# Patient Record
Sex: MALE | Race: WHITE | NOT HISPANIC OR LATINO | URBAN - NONMETROPOLITAN AREA
[De-identification: names, ages, dates, MRNs, and addresses within clinical notes are randomized per-mention and may not be internally consistent; named-entity substitution may affect disease eponyms.]

---

## 2023-10-10 ENCOUNTER — OFFICE VISIT (OUTPATIENT)
Dept: PRIMARY CARE | Facility: CLINIC | Age: 28
End: 2023-10-10
Payer: COMMERCIAL

## 2023-10-10 VITALS
HEART RATE: 123 BPM | WEIGHT: 165.25 LBS | OXYGEN SATURATION: 97 % | SYSTOLIC BLOOD PRESSURE: 174 MMHG | DIASTOLIC BLOOD PRESSURE: 90 MMHG

## 2023-10-10 DIAGNOSIS — R00.0 TACHYCARDIA: ICD-10-CM

## 2023-10-10 DIAGNOSIS — F41.9 ANXIETY: Primary | ICD-10-CM

## 2023-10-10 DIAGNOSIS — Z13.1 DIABETES MELLITUS SCREENING: ICD-10-CM

## 2023-10-10 DIAGNOSIS — R03.0 ELEVATED BP WITHOUT DIAGNOSIS OF HYPERTENSION: ICD-10-CM

## 2023-10-10 DIAGNOSIS — Z13.0 SCREENING FOR DEFICIENCY ANEMIA: ICD-10-CM

## 2023-10-10 PROBLEM — F41.1 GENERALIZED ANXIETY DISORDER: Status: ACTIVE | Noted: 2023-10-10

## 2023-10-10 PROCEDURE — 99213 OFFICE O/P EST LOW 20 MIN: CPT | Performed by: FAMILY MEDICINE

## 2023-10-10 RX ORDER — SERTRALINE HYDROCHLORIDE 50 MG/1
50 TABLET, FILM COATED ORAL DAILY
Qty: 30 TABLET | Refills: 5 | Status: SHIPPED | OUTPATIENT
Start: 2023-10-10 | End: 2024-04-12

## 2023-10-10 RX ORDER — PROPRANOLOL HYDROCHLORIDE 20 MG/1
TABLET ORAL
COMMUNITY
Start: 2019-04-22

## 2023-10-10 RX ORDER — HYDROXYZINE PAMOATE 25 MG/1
25 CAPSULE ORAL 3 TIMES DAILY PRN
Qty: 30 CAPSULE | Refills: 0 | Status: SHIPPED | OUTPATIENT
Start: 2023-10-10 | End: 2023-11-09

## 2023-10-10 ASSESSMENT — ENCOUNTER SYMPTOMS
CHEST TIGHTNESS: 1
PALPITATIONS: 1
APPETITE CHANGE: 1
SHORTNESS OF BREATH: 1

## 2023-10-10 NOTE — PROGRESS NOTES
Subjective   Patient ID: Marc Pineda is a 27 y.o. male who presents for Anxiety (Discuss starting on medication again, fast heart rate ).  Anxiety  Symptoms include palpitations and shortness of breath. Patient reports no chest pain.     -just started, last week 1 day by evening went away  -last night began again   -yesterday morning drank coffee, previously daily coffee drinker   -last night had SOB, chest tightness, palpitations   -when heart races, SOB because anxious about the racing heart   -worried last night of it being something more, got worse symptoms   -felt like panic attack, took sleeping pills, slept and felt more normal this morning   -coming into Dr today got worse again   -trigger: was occasionally drinking celsius energy drinks, last week when had some had   -yesterday morning, lack of appetite with symptoms   -gets better with walking   -more stress this summer, felt better, now feels much worse   -after all the stress finished, crashed with anxiety 1 month after   -Saturday - chugged coffee, when hunting got more panicked     Recheck BP  -176/84   -check from previous visits are high as well    Family hx  -most on anxiety medications, sister, mom, aunts / uncles      Review of Systems   Constitutional:  Positive for appetite change.   Respiratory:  Positive for chest tightness and shortness of breath.         When having palpitations / racing heart   Cardiovascular:  Positive for palpitations. Negative for chest pain.       Objective   /90   Pulse (!) 123   Wt 75 kg (165 lb 4 oz)   SpO2 97%     Physical Exam  Constitutional:       Appearance: Normal appearance.   HENT:      Head: Normocephalic.      Nose: Nose normal.      Mouth/Throat:      Mouth: Mucous membranes are moist.   Eyes:      General: No scleral icterus.  Cardiovascular:      Rate and Rhythm: Regular rhythm. Tachycardia present.      Heart sounds: Normal heart sounds.   Pulmonary:      Effort: Pulmonary effort is  "normal.      Breath sounds: Normal breath sounds.   Abdominal:      Palpations: Abdomen is soft.   Musculoskeletal:         General: Normal range of motion.      Cervical back: Normal range of motion.   Skin:     General: Skin is warm and dry.   Neurological:      General: No focal deficit present.      Mental Status: He is alert.       Assessment/Plan   Problem List Items Addressed This Visit    None  #Anxiety: 2/2 anxiety vs htn vs thyroid  -BP/HR elevated at each visit- 2/2 anxiety? Thyroid also the cause   -was on propranolol   -refused blood work and ziopatch today  -decreased caffeine     #tachycardia:  -not in SVT range but possible with \"heart racing\" episodes    #Elevated BP:  -white coat? Thyroid?       Addendum:   -pt called on 10/11 states he had elevated BP and hr of 136  -I again suggest blood work and ziopatch- he will make apt  "

## 2023-10-10 NOTE — PATIENT INSTRUCTIONS
If not better with meds consider having thyroid checked     Check BP per day- resting, if >140 on top or >90 on bottom then we need to start medicine  If heart rate >100 we should do blood work and heart monitor

## 2023-10-11 ENCOUNTER — TELEMEDICINE (OUTPATIENT)
Dept: PRIMARY CARE | Facility: CLINIC | Age: 28
End: 2023-10-11
Payer: COMMERCIAL

## 2023-10-11 DIAGNOSIS — F41.1 GENERALIZED ANXIETY DISORDER: ICD-10-CM

## 2023-10-11 DIAGNOSIS — R03.0 ELEVATED BP WITHOUT DIAGNOSIS OF HYPERTENSION: Primary | ICD-10-CM

## 2023-10-11 DIAGNOSIS — R00.0 TACHYCARDIA: ICD-10-CM

## 2023-10-11 PROCEDURE — 99213 OFFICE O/P EST LOW 20 MIN: CPT | Performed by: FAMILY MEDICINE

## 2023-10-12 ENCOUNTER — CLINICAL SUPPORT (OUTPATIENT)
Dept: PRIMARY CARE | Facility: CLINIC | Age: 28
End: 2023-10-12
Payer: COMMERCIAL

## 2023-10-12 ENCOUNTER — APPOINTMENT (OUTPATIENT)
Dept: PRIMARY CARE | Facility: CLINIC | Age: 28
End: 2023-10-12
Payer: COMMERCIAL

## 2023-10-12 DIAGNOSIS — Z13.1 DIABETES MELLITUS SCREENING: ICD-10-CM

## 2023-10-12 DIAGNOSIS — Z13.0 SCREENING FOR DEFICIENCY ANEMIA: ICD-10-CM

## 2023-10-12 DIAGNOSIS — F41.9 ANXIETY: ICD-10-CM

## 2023-10-12 LAB
ALBUMIN SERPL BCP-MCNC: 4.9 G/DL (ref 3.4–5)
ALP SERPL-CCNC: 61 U/L (ref 33–120)
ALT SERPL W P-5'-P-CCNC: 19 U/L (ref 10–52)
ANION GAP SERPL CALC-SCNC: 13 MMOL/L (ref 10–20)
AST SERPL W P-5'-P-CCNC: 19 U/L (ref 9–39)
BILIRUB SERPL-MCNC: 1.2 MG/DL (ref 0–1.2)
BUN SERPL-MCNC: 16 MG/DL (ref 6–23)
CALCIUM SERPL-MCNC: 9.8 MG/DL (ref 8.6–10.3)
CHLORIDE SERPL-SCNC: 100 MMOL/L (ref 98–107)
CO2 SERPL-SCNC: 30 MMOL/L (ref 21–32)
CREAT SERPL-MCNC: 1.04 MG/DL (ref 0.5–1.3)
ERYTHROCYTE [DISTWIDTH] IN BLOOD BY AUTOMATED COUNT: 11.3 % (ref 11.5–14.5)
GFR SERPL CREATININE-BSD FRML MDRD: >90 ML/MIN/1.73M*2
GLUCOSE SERPL-MCNC: 94 MG/DL (ref 74–99)
HCT VFR BLD AUTO: 51.5 % (ref 41–52)
HGB BLD-MCNC: 17.2 G/DL (ref 13.5–17.5)
MCH RBC QN AUTO: 30.1 PG (ref 26–34)
MCHC RBC AUTO-ENTMCNC: 33.4 G/DL (ref 32–36)
MCV RBC AUTO: 90 FL (ref 80–100)
NRBC BLD-RTO: 0 /100 WBCS (ref 0–0)
PLATELET # BLD AUTO: 274 X10*3/UL (ref 150–450)
PMV BLD AUTO: 10 FL (ref 7.5–11.5)
POTASSIUM SERPL-SCNC: 4.3 MMOL/L (ref 3.5–5.3)
PROT SERPL-MCNC: 7.5 G/DL (ref 6.4–8.2)
RBC # BLD AUTO: 5.71 X10*6/UL (ref 4.5–5.9)
SODIUM SERPL-SCNC: 139 MMOL/L (ref 136–145)
TSH SERPL-ACNC: 3.55 MIU/L (ref 0.44–3.98)
WBC # BLD AUTO: 5.5 X10*3/UL (ref 4.4–11.3)

## 2023-10-12 PROCEDURE — 80053 COMPREHEN METABOLIC PANEL: CPT

## 2023-10-12 PROCEDURE — 85027 COMPLETE CBC AUTOMATED: CPT

## 2023-10-12 PROCEDURE — 84443 ASSAY THYROID STIM HORMONE: CPT

## 2023-10-12 PROCEDURE — 36415 COLL VENOUS BLD VENIPUNCTURE: CPT

## 2023-10-13 ENCOUNTER — TELEPHONE (OUTPATIENT)
Dept: PRIMARY CARE | Facility: CLINIC | Age: 28
End: 2023-10-13
Payer: COMMERCIAL

## 2023-10-13 DIAGNOSIS — R00.0 TACHYCARDIA: Primary | ICD-10-CM

## 2023-10-13 NOTE — TELEPHONE ENCOUNTER
Gets eposodes of tachycardia.  Wonders if he should check it out?  Just started meds foe anxiety on[facility]us.  [phone] [id number]

## 2023-10-19 ASSESSMENT — ENCOUNTER SYMPTOMS
CHEST TIGHTNESS: 1
APPETITE CHANGE: 1
PALPITATIONS: 1
SHORTNESS OF BREATH: 1

## 2023-10-19 NOTE — PROGRESS NOTES
Subjective   Patient ID: Marc Pineda is a 27 y.o. male who presents for No chief complaint on file..  Anxiety  Symptoms include palpitations and shortness of breath. Patient reports no chest pain.       -just started, last week 1 day by evening went away  -last night began again   -yesterday morning drank coffee, previously daily coffee drinker   -last night had SOB, chest tightness, palpitations   -when heart races, SOB because anxious about the racing heart   -worried last night of it being something more, got worse symptoms   -felt like panic attack, took sleeping pills, slept and felt more normal this morning   -coming into Dr today got worse again   -trigger: was occasionally drinking celsius energy drinks, last week when had some had   -yesterday morning, lack of appetite with symptoms   -gets better with walking   -more stress this summer, felt better, now feels much worse   -after all the stress finished, crashed with anxiety 1 month after   -Saturday - chugged coffee, when hunting got more panicked     Recheck BP  -176/84   -check from previous visits are high as well    Family hx  -most on anxiety medications, sister, mom, aunts / uncles      Review of Systems   Constitutional:  Positive for appetite change.   Respiratory:  Positive for chest tightness and shortness of breath.         When having palpitations / racing heart   Cardiovascular:  Positive for palpitations. Negative for chest pain.       Objective   There were no vitals taken for this visit.    Physical Exam  Constitutional:       Appearance: Normal appearance.   HENT:      Head: Normocephalic.      Nose: Nose normal.      Mouth/Throat:      Mouth: Mucous membranes are moist.   Eyes:      General: No scleral icterus.  Cardiovascular:      Rate and Rhythm: Regular rhythm. Tachycardia present.      Heart sounds: Normal heart sounds.   Pulmonary:      Effort: Pulmonary effort is normal.      Breath sounds: Normal breath sounds.   Abdominal:  "     Palpations: Abdomen is soft.   Musculoskeletal:         General: Normal range of motion.      Cervical back: Normal range of motion.   Skin:     General: Skin is warm and dry.   Neurological:      General: No focal deficit present.      Mental Status: He is alert.         Assessment/Plan   Problem List Items Addressed This Visit    None  #Anxiety: 2/2 anxiety vs htn vs thyroid  -BP/HR elevated at each visit- 2/2 anxiety? Thyroid also the cause   -was on propranolol   -refused blood work and ziopatch today  -decreased caffeine     #tachycardia:  -not in SVT range but possible with \"heart racing\" episodes    #Elevated BP:  -white coat? Thyroid?       Addendum:   -pt called on 10/11 states he had elevated BP and hr of 136  -I again suggest blood work and ziopatch- he will make apt  "

## 2023-10-31 ENCOUNTER — TELEPHONE (OUTPATIENT)
Dept: PRIMARY CARE | Facility: CLINIC | Age: 28
End: 2023-10-31
Payer: COMMERCIAL

## 2023-11-09 ENCOUNTER — TELEMEDICINE (OUTPATIENT)
Dept: PRIMARY CARE | Facility: CLINIC | Age: 28
End: 2023-11-09
Payer: COMMERCIAL

## 2023-11-09 DIAGNOSIS — F41.1 GENERALIZED ANXIETY DISORDER: Primary | ICD-10-CM

## 2023-11-09 PROCEDURE — 99213 OFFICE O/P EST LOW 20 MIN: CPT | Performed by: FAMILY MEDICINE

## 2023-11-09 RX ORDER — LORAZEPAM 0.5 MG/1
0.5 TABLET ORAL 2 TIMES DAILY PRN
Qty: 30 TABLET | Refills: 0 | Status: SHIPPED | OUTPATIENT
Start: 2023-11-09 | End: 2023-11-24

## 2023-11-09 ASSESSMENT — ENCOUNTER SYMPTOMS
PALPITATIONS: 1
APPETITE CHANGE: 1
SHORTNESS OF BREATH: 1
CHEST TIGHTNESS: 1

## 2023-11-09 NOTE — PROGRESS NOTES
"Subjective   Patient ID: Marc Pineda is a 27 y.o. male who presents for No chief complaint on file..  Anxiety  Symptoms include palpitations and shortness of breath. Patient reports no chest pain.     Hypertension:  BP was very elevated in office, at home it is running approx 140 systolic and nml heart rate. Admits that he gets very nervous about bp check- would have his wife repeatedly recheck  its and BP and HR will improve. One time his HR was 138 then down to 80. Check HR this am when anxious about work and hr 120 then got to work and 80.   On zoloft and vistaril- felt better but taking vistaril 3x/d  When hunting he got nervious about getting out of woods then started to feel anxious and hr increased and racing- then after walking more he felt better. No syncope/near syncope  When at work- with a normal schedule he does just fine but if something comes up then get he anxious. Then gets anxious about being anxious   Review of Systems   Constitutional:  Positive for appetite change.   Respiratory:  Positive for chest tightness and shortness of breath.         When having palpitations / racing heart   Cardiovascular:  Positive for palpitations. Negative for chest pain.       Objective   There were no vitals taken for this visit.    Physical Exam    Assessment/Plan   Problem List Items Addressed This Visit    None  #Anxiety: 2/2 anxiety vs htn vs thyroid  -BP/HR elevated at each visit- 2/2 anxiety? Thyroid also the cause   -was on propranolol   -nml cbc,cmp,tsh  -decreased caffeine   -failed vistaril  -increase zoloft to 100mg  -add ativan- long discussion about my concerns but his anxiety seems significantly impacting his life.     #tachycardia:  -not in SVT range but possible with \"heart racing\" episodes  -suggest zio    #Elevated BP:  -white coat?        "

## 2024-04-12 DIAGNOSIS — F41.9 ANXIETY: ICD-10-CM

## 2024-04-12 RX ORDER — SERTRALINE HYDROCHLORIDE 50 MG/1
50 TABLET, FILM COATED ORAL DAILY
Qty: 30 TABLET | Refills: 5 | Status: SHIPPED | OUTPATIENT
Start: 2024-04-12

## 2025-02-21 ENCOUNTER — TELEPHONE (OUTPATIENT)
Dept: PRIMARY CARE | Facility: CLINIC | Age: 30
End: 2025-02-21
Payer: COMMERCIAL

## 2025-02-21 DIAGNOSIS — F41.9 ANXIETY: ICD-10-CM

## 2025-02-21 RX ORDER — SERTRALINE HYDROCHLORIDE 50 MG/1
50 TABLET, FILM COATED ORAL DAILY
Qty: 90 TABLET | Refills: 3 | Status: SHIPPED | OUTPATIENT
Start: 2025-02-21

## 2025-02-21 NOTE — TELEPHONE ENCOUNTER
Pt is needing a refill of Zoloft 50 mg. He is seeing you in two weeks to establish care/have an annual, but he's only down to 4 pills. Please send to the [facility] in [place].

## 2025-03-07 ENCOUNTER — APPOINTMENT (OUTPATIENT)
Dept: PRIMARY CARE | Facility: CLINIC | Age: 30
End: 2025-03-07
Payer: COMMERCIAL